# Patient Record
Sex: FEMALE | Race: WHITE | NOT HISPANIC OR LATINO | Employment: FULL TIME | URBAN - NONMETROPOLITAN AREA
[De-identification: names, ages, dates, MRNs, and addresses within clinical notes are randomized per-mention and may not be internally consistent; named-entity substitution may affect disease eponyms.]

---

## 2024-08-06 ENCOUNTER — HOSPITAL ENCOUNTER (OUTPATIENT)
Facility: HOSPITAL | Age: 24
Discharge: HOME | End: 2024-08-06
Admitting: NURSE PRACTITIONER
Payer: COMMERCIAL

## 2024-08-06 DIAGNOSIS — R10.9 ABDOMINAL PAIN, UNSPECIFIED ABDOMINAL LOCATION: ICD-10-CM

## 2024-08-06 PROCEDURE — 74176 CT ABD & PELVIS W/O CONTRAST: CPT

## 2024-08-07 PROCEDURE — 74176 CT ABD & PELVIS W/O CONTRAST: CPT | Performed by: RADIOLOGY

## 2024-08-21 ENCOUNTER — OFFICE VISIT (OUTPATIENT)
Dept: UROLOGY | Facility: CLINIC | Age: 24
End: 2024-08-21
Payer: COMMERCIAL

## 2024-08-21 VITALS
DIASTOLIC BLOOD PRESSURE: 70 MMHG | HEART RATE: 65 BPM | WEIGHT: 155.6 LBS | SYSTOLIC BLOOD PRESSURE: 105 MMHG | BODY MASS INDEX: 24.42 KG/M2 | HEIGHT: 67 IN

## 2024-08-21 DIAGNOSIS — Q62.11 HYDRONEPHROSIS WITH URETEROPELVIC JUNCTION (UPJ) OBSTRUCTION: Primary | ICD-10-CM

## 2024-08-21 LAB
BILIRUB BLD-MCNC: NEGATIVE MG/DL
CLARITY, POC: CLEAR
COLOR UR: YELLOW
EXPIRATION DATE: NORMAL
GLUCOSE UR STRIP-MCNC: NEGATIVE MG/DL
KETONES UR QL: NEGATIVE
LEUKOCYTE EST, POC: NEGATIVE
Lab: NORMAL
NITRITE UR-MCNC: NEGATIVE MG/ML
PH UR: 6 [PH] (ref 5–8)
PROT UR STRIP-MCNC: NEGATIVE MG/DL
RBC # UR STRIP: NEGATIVE /UL
SP GR UR: 1 (ref 1–1.03)
UROBILINOGEN UR QL: NORMAL

## 2024-08-21 RX ORDER — NORGESTIMATE AND ETHINYL ESTRADIOL 7DAYSX3 28
KIT ORAL
COMMUNITY

## 2024-08-21 NOTE — PROGRESS NOTES
"Chief Complaint:    Chief Complaint   Patient presents with    hydronephrosis       Vital Signs:   /70   Pulse 65   Ht 170.2 cm (67\")   Wt 70.6 kg (155 lb 9.6 oz)   BMI 24.37 kg/m²   Body mass index is 24.37 kg/m².      HPI:  Jesica King is a 24 y.o. female who presents today for initial evaluation     History of Present Illness  Ms. King presents to the clinic for initial evaluation of hydronephrosis.  She has a very limited past medical history.  She does endorse intermittent right sided back and flank pain that is worsened over the past 2 years.  She reports this happens sporadically and last for a few minutes and then will resolve.  She rates this as a 8 out of 10 at times.  She has been referred to us by CISCO Oneil who proceeded forward with an ultrasound in July that came back with moderate to severe right-sided hydronephrosis.  He proceeded for with a CT scan of the abdomen pelvis that showed right-sided hydronephrosis with termination at the UPJ most consistent with a chronic UPJ obstruction.  She had lab work completed in July that showed a normal creatinine of 0.59 with a GFR greater than 60.  Urinalysis in office today is completely unremarkable.  She denies any gross hematuria, previous history of kidney stones, or inability urinate.  She does endorse constipation.      Past Medical History:  History reviewed. No pertinent past medical history.    Current Meds:  Current Outpatient Medications   Medication Sig Dispense Refill    Tri-Sprintec 0.18/0.215/0.25 MG-35 MCG per tablet        No current facility-administered medications for this visit.        Allergies:   No Known Allergies     Past Surgical History:  History reviewed. No pertinent surgical history.    Social History:  Social History     Socioeconomic History    Marital status:    Tobacco Use    Smoking status: Never     Passive exposure: Never    Smokeless tobacco: Never   Vaping Use    Vaping status: Never " Used   Substance and Sexual Activity    Alcohol use: Yes     Comment: Only socially    Drug use: Never    Sexual activity: Yes     Partners: Male     Birth control/protection: Birth control pill       Family History:  Family History   Problem Relation Age of Onset    Cancer Maternal Grandmother         Lung cancer    Hypertension Maternal Grandmother     Cancer Paternal Grandfather         Leukemia    Cancer Paternal Grandmother         Ovarian Cancer       Review of Systems:  Review of Systems   Constitutional:  Negative for fatigue, fever and unexpected weight change.   Respiratory:  Negative for chest tightness and shortness of breath.    Cardiovascular:  Negative for chest pain.   Gastrointestinal:  Positive for constipation. Negative for abdominal pain, diarrhea, nausea and vomiting.   Genitourinary:  Positive for flank pain and frequency. Negative for difficulty urinating, dysuria and urgency.   Musculoskeletal:  Positive for back pain.   Skin:  Negative for rash.   Psychiatric/Behavioral:  Negative for confusion and suicidal ideas.        Physical Exam:  Physical Exam  Constitutional:       General: She is not in acute distress.     Appearance: Normal appearance.   HENT:      Head: Normocephalic and atraumatic.      Nose: Nose normal.      Mouth/Throat:      Mouth: Mucous membranes are moist.   Eyes:      Conjunctiva/sclera: Conjunctivae normal.   Cardiovascular:      Rate and Rhythm: Normal rate and regular rhythm.      Pulses: Normal pulses.      Heart sounds: Normal heart sounds.   Pulmonary:      Effort: Pulmonary effort is normal.      Breath sounds: Normal breath sounds.   Abdominal:      General: Bowel sounds are normal.      Palpations: Abdomen is soft.      Comments: Very mild right CVA tenderness   Musculoskeletal:         General: Normal range of motion.      Cervical back: Normal range of motion.   Skin:     General: Skin is warm.   Neurological:      General: No focal deficit present.      Mental  Status: She is alert and oriented to person, place, and time.   Psychiatric:         Mood and Affect: Mood normal.         Behavior: Behavior normal.         Thought Content: Thought content normal.         Judgment: Judgment normal.             Recent Image (CT and/or KUB):   CT Abdomen and Pelvis: No results found for this or any previous visit.     CT Stone Protocol: No results found for this or any previous visit.     KUB: No results found for this or any previous visit.       Labs:  Brief Urine Lab Results  (Last result in the past 365 days)        Color   Clarity   Blood   Leuk Est   Nitrite   Protein   CREAT   Urine HCG        08/21/24 1442 Yellow   Clear   Negative   Negative   Negative   Negative                 Office Visit on 08/21/2024   Component Date Value Ref Range Status    Color 08/21/2024 Yellow  Yellow, Straw, Dark Yellow, Elisa Final    Clarity, UA 08/21/2024 Clear  Clear Final    Specific Gravity  08/21/2024 1.005  1.005 - 1.030 Final    pH, Urine 08/21/2024 6.0  5.0 - 8.0 Final    Leukocytes 08/21/2024 Negative  Negative Final    Nitrite, UA 08/21/2024 Negative  Negative Final    Protein, POC 08/21/2024 Negative  Negative mg/dL Final    Glucose, UA 08/21/2024 Negative  Negative mg/dL Final    Ketones, UA 08/21/2024 Negative  Negative Final    Urobilinogen, UA 08/21/2024 Normal  Normal, 0.2 E.U./dL Final    Bilirubin 08/21/2024 Negative  Negative Final    Blood, UA 08/21/2024 Negative  Negative Final    Lot Number 08/21/2024 [id number]   Final    Expiration Date 08/21/2024 10/25/24   Final        Procedure: None  Procedures     I have reviewed and agree with the above PMH, PSH, FMH, social history, medications, allergies, and labs.     Assessment/Plan:   Problem List Items Addressed This Visit       Hydronephrosis with ureteropelvic junction (UPJ) obstruction - Primary    Relevant Orders    POC Urinalysis Dipstick, Automated (Completed)       Health Maintenance:   Health Maintenance Due    Topic Date Due    HPV VACCINES (1 - 3-dose series) Never done    TDAP/TD VACCINES (1 - Tdap) Never done    COVID-19 Vaccine (1 - 2023-24 season) Never done    HEPATITIS C SCREENING  Never done    ANNUAL PHYSICAL  Never done    PAP SMEAR  Never done    INFLUENZA VACCINE  08/01/2024        Smoking Counseling: Never smoked or use smokeless tobacco.  Counseling given.    Urine Incontinence: Patient reports that she is not currently experiencing any symptoms of urinary incontinence.    Patient was given instructions and counseling regarding her condition or for health maintenance advice. Please see specific information pulled into the AVS if appropriate.    Patient Education:   UPJ obstruction with hydronephrosis -discussed with the patient the pathophysiology of this condition in detail.  Discussed forms of intervention which can include but not limited to observation, nuclear medicine study, cystoscopy with retrograde pyelogram, and duraplasty when indicated.  Discussed the risk and benefits of each of these in detail with the patient.  Discussed the risk of general anesthesia in detail with the patient.  I did advise patient that CT scan showed significant hydronephrosis with a normal kidney function.  Advised her this can be a chronic stenosis and most likely congenital in nature given very minimal past medical history.  I would recommend to complete a nuclear medicine study first however patient wishes to discuss this further with her family.  Did discuss the risk and benefits of this procedures in detail.  Discussed the use of Lasix during procedure as well.  Patient states she will call back tomorrow after discussing with family.  Advised to return to the clinic if symptoms worsen.  She verbalized understanding    Visit Diagnoses:    ICD-10-CM ICD-9-CM   1. Hydronephrosis with ureteropelvic junction (UPJ) obstruction  Q62.11 591     A total of 30 minutes were spent coordinating this patient’s care in clinic  today; 15 minutes of which were face-to-face with the patient, reviewing medical history and counseling on the current treatment and followup plan.  All questions were answered to patient's satisfaction.    Meds Ordered During Visit:  No orders of the defined types were placed in this encounter.      Follow Up Appointment: As needed per patient's request.  She will call back to schedule procedure.  No follow-ups on file.      This document has been electronically signed by Wili Devries PA-C   August 21, 2024 15:14 EDT    Part of this note may be an electronic transcription/translation of spoken language to printed text using the Dragon Dictation System.

## 2024-08-22 ENCOUNTER — TELEPHONE (OUTPATIENT)
Dept: UROLOGY | Facility: CLINIC | Age: 24
End: 2024-08-22
Payer: COMMERCIAL

## 2024-08-22 DIAGNOSIS — Q62.11 HYDRONEPHROSIS WITH URETEROPELVIC JUNCTION (UPJ) OBSTRUCTION: Primary | ICD-10-CM

## 2024-08-22 NOTE — TELEPHONE ENCOUNTER
Patient called back to let Qasim know that she would like to proceed with the test/procedure he discussed with her yesterday in the clinic. She would like a call back please.

## 2024-09-03 ENCOUNTER — HOSPITAL ENCOUNTER (OUTPATIENT)
Dept: NUCLEAR MEDICINE | Facility: HOSPITAL | Age: 24
Discharge: HOME | End: 2024-09-03
Payer: COMMERCIAL

## 2024-09-03 DIAGNOSIS — Q62.11 HYDRONEPHROSIS WITH URETEROPELVIC JUNCTION (UPJ) OBSTRUCTION: ICD-10-CM

## 2024-09-03 PROCEDURE — A9562 TC99M MERTIATIDE: HCPCS

## 2024-09-03 PROCEDURE — 0 TECHNETIUM MERTIATIDE

## 2024-09-03 PROCEDURE — 25010000002 FUROSEMIDE PER 20 MG

## 2024-09-03 PROCEDURE — 78725 KIDNEY FUNCTION STUDY: CPT

## 2024-09-03 RX ORDER — FUROSEMIDE 10 MG/ML
20 INJECTION INTRAMUSCULAR; INTRAVENOUS
Status: COMPLETED | OUTPATIENT
Start: 2024-09-03 | End: 2024-09-03

## 2024-09-03 RX ADMIN — TECHNESCAN TC 99M MERTIATIDE 1 DOSE: 1 INJECTION, POWDER, LYOPHILIZED, FOR SOLUTION INTRAVENOUS at 14:58

## 2024-09-03 RX ADMIN — FUROSEMIDE 20 MG: 10 INJECTION, SOLUTION INTRAMUSCULAR; INTRAVENOUS at 15:08

## 2024-09-04 ENCOUNTER — TELEPHONE (OUTPATIENT)
Dept: UROLOGY | Facility: CLINIC | Age: 24
End: 2024-09-04
Payer: COMMERCIAL

## 2024-09-04 NOTE — TELEPHONE ENCOUNTER
----- Message from Wili Devries sent at 9/3/2024  7:14 PM EDT -----  Regarding: FW:  Please let patient know renal function study still showed hydronephrosis but right kidney was function at 48% and left kidney was contributing 52%. This is basically a normal study and shows no concerns of severe blockage at this time. She may be scheduled for a 6 month follow up if she wishes. Thanks.  ----- Message -----  From: [facility] [place] [facility]  Sent: 9/3/2024   4:05 PM EDT  To: Wili Devries PA-C

## 2024-09-04 NOTE — TELEPHONE ENCOUNTER
Left message for the patient regarding her renal function per Qasim which was a normal study, patient needs to be scheduled back for a 6 month follow up if she wishes.